# Patient Record
Sex: MALE | Race: WHITE | NOT HISPANIC OR LATINO | Employment: OTHER | URBAN - METROPOLITAN AREA
[De-identification: names, ages, dates, MRNs, and addresses within clinical notes are randomized per-mention and may not be internally consistent; named-entity substitution may affect disease eponyms.]

---

## 2022-06-14 ENCOUNTER — HOSPITAL ENCOUNTER (EMERGENCY)
Facility: HOSPITAL | Age: 87
Discharge: HOME | End: 2022-06-14
Attending: EMERGENCY MEDICINE | Admitting: EMERGENCY MEDICINE

## 2022-06-14 VITALS
TEMPERATURE: 98.4 F | WEIGHT: 152 LBS | HEIGHT: 70 IN | RESPIRATION RATE: 16 BRPM | SYSTOLIC BLOOD PRESSURE: 173 MMHG | OXYGEN SATURATION: 100 % | BODY MASS INDEX: 21.76 KG/M2 | DIASTOLIC BLOOD PRESSURE: 74 MMHG | HEART RATE: 60 BPM

## 2022-06-14 DIAGNOSIS — R53.1 GENERALIZED WEAKNESS: ICD-10-CM

## 2022-06-14 DIAGNOSIS — U07.1 COVID-19: Primary | ICD-10-CM

## 2022-06-14 LAB
ALBUMIN SERPL-MCNC: 3.8 G/DL (ref 3.5–5.2)
ALBUMIN/GLOB SERPL: 1.5 G/DL
ALP SERPL-CCNC: 111 U/L (ref 39–117)
ALT SERPL W P-5'-P-CCNC: 22 U/L (ref 1–41)
ANION GAP SERPL CALCULATED.3IONS-SCNC: 12.3 MMOL/L (ref 5–15)
ANISOCYTOSIS BLD QL: NORMAL
AST SERPL-CCNC: 27 U/L (ref 1–40)
BASOPHILS # BLD AUTO: 0.03 10*3/MM3 (ref 0–0.2)
BASOPHILS NFR BLD AUTO: 0.5 % (ref 0–1.5)
BILIRUB SERPL-MCNC: 0.3 MG/DL (ref 0–1.2)
BILIRUB UR QL STRIP: NEGATIVE
BUN SERPL-MCNC: 38 MG/DL (ref 8–23)
BUN/CREAT SERPL: 22.8 (ref 7–25)
CALCIUM SPEC-SCNC: 9.2 MG/DL (ref 8.6–10.5)
CHLORIDE SERPL-SCNC: 102 MMOL/L (ref 98–107)
CLARITY UR: CLEAR
CO2 SERPL-SCNC: 21.7 MMOL/L (ref 22–29)
COLOR UR: YELLOW
CREAT SERPL-MCNC: 1.67 MG/DL (ref 0.76–1.27)
DEPRECATED RDW RBC AUTO: 65.9 FL (ref 37–54)
EGFRCR SERPLBLD CKD-EPI 2021: 38.9 ML/MIN/1.73
EOSINOPHIL # BLD AUTO: 0.24 10*3/MM3 (ref 0–0.4)
EOSINOPHIL NFR BLD AUTO: 4.2 % (ref 0.3–6.2)
ERYTHROCYTE [DISTWIDTH] IN BLOOD BY AUTOMATED COUNT: 15.5 % (ref 12.3–15.4)
GLOBULIN UR ELPH-MCNC: 2.6 GM/DL
GLUCOSE SERPL-MCNC: 126 MG/DL (ref 65–99)
GLUCOSE UR STRIP-MCNC: NEGATIVE MG/DL
HCT VFR BLD AUTO: 26.8 % (ref 37.5–51)
HGB BLD-MCNC: 8.7 G/DL (ref 13–17.7)
HGB UR QL STRIP.AUTO: NEGATIVE
IMM GRANULOCYTES # BLD AUTO: 0.03 10*3/MM3 (ref 0–0.05)
IMM GRANULOCYTES NFR BLD AUTO: 0.5 % (ref 0–0.5)
KETONES UR QL STRIP: NEGATIVE
LEUKOCYTE ESTERASE UR QL STRIP.AUTO: NEGATIVE
LYMPHOCYTES # BLD AUTO: 2.22 10*3/MM3 (ref 0.7–3.1)
LYMPHOCYTES NFR BLD AUTO: 38.5 % (ref 19.6–45.3)
MACROCYTES BLD QL SMEAR: NORMAL
MCH RBC QN AUTO: 37.2 PG (ref 26.6–33)
MCHC RBC AUTO-ENTMCNC: 32.5 G/DL (ref 31.5–35.7)
MCV RBC AUTO: 114.5 FL (ref 79–97)
MONOCYTES # BLD AUTO: 0.63 10*3/MM3 (ref 0.1–0.9)
MONOCYTES NFR BLD AUTO: 10.9 % (ref 5–12)
NEUTROPHILS NFR BLD AUTO: 2.62 10*3/MM3 (ref 1.7–7)
NEUTROPHILS NFR BLD AUTO: 45.4 % (ref 42.7–76)
NITRITE UR QL STRIP: NEGATIVE
NRBC BLD AUTO-RTO: 0 /100 WBC (ref 0–0.2)
PH UR STRIP.AUTO: 5.5 [PH] (ref 4.5–8)
PLATELET # BLD AUTO: 299 10*3/MM3 (ref 140–450)
PMV BLD AUTO: 9.4 FL (ref 6–12)
POTASSIUM SERPL-SCNC: 4.7 MMOL/L (ref 3.5–5.2)
PROT SERPL-MCNC: 6.4 G/DL (ref 6–8.5)
PROT UR QL STRIP: NEGATIVE
RBC # BLD AUTO: 2.34 10*6/MM3 (ref 4.14–5.8)
SMALL PLATELETS BLD QL SMEAR: ADEQUATE
SODIUM SERPL-SCNC: 136 MMOL/L (ref 136–145)
SP GR UR STRIP: <=1.005 (ref 1–1.03)
UROBILINOGEN UR QL STRIP: NORMAL
WBC MORPH BLD: NORMAL
WBC NRBC COR # BLD: 5.77 10*3/MM3 (ref 3.4–10.8)

## 2022-06-14 PROCEDURE — 99283 EMERGENCY DEPT VISIT LOW MDM: CPT

## 2022-06-14 PROCEDURE — 85007 BL SMEAR W/DIFF WBC COUNT: CPT

## 2022-06-14 PROCEDURE — 81003 URINALYSIS AUTO W/O SCOPE: CPT

## 2022-06-14 PROCEDURE — 80053 COMPREHEN METABOLIC PANEL: CPT

## 2022-06-14 PROCEDURE — 99282 EMERGENCY DEPT VISIT SF MDM: CPT

## 2022-06-14 PROCEDURE — 36415 COLL VENOUS BLD VENIPUNCTURE: CPT

## 2022-06-14 PROCEDURE — 85025 COMPLETE CBC W/AUTO DIFF WBC: CPT

## 2022-06-14 RX ORDER — ATORVASTATIN CALCIUM 80 MG/1
80 TABLET, FILM COATED ORAL DAILY
COMMUNITY

## 2022-06-14 RX ORDER — CHOLECALCIFEROL (VITAMIN D3) 125 MCG
1000 CAPSULE ORAL DAILY
COMMUNITY

## 2022-06-14 RX ORDER — ASPIRIN 81 MG/1
81 TABLET ORAL DAILY
COMMUNITY

## 2022-06-14 RX ORDER — OLANZAPINE 2.5 MG/1
2.5 TABLET ORAL NIGHTLY
COMMUNITY

## 2022-06-14 RX ORDER — FOLIC ACID 1 MG/1
2 TABLET ORAL DAILY
COMMUNITY

## 2022-06-14 RX ORDER — GLIPIZIDE 10 MG/1
10 TABLET ORAL
COMMUNITY

## 2022-06-14 RX ORDER — ACETAMINOPHEN 500 MG
500 TABLET ORAL EVERY 6 HOURS PRN
COMMUNITY

## 2022-06-14 NOTE — DISCHARGE INSTRUCTIONS
You have COVID-19.  Recommend plenty of fluids and rest.  Tylenol and/or ibuprofen as needed for fever, body aches, headache.  Isolate from other family members as above.  Anyone exposed to you should quarantine per CDC recommendations.  Go to CDC.gov for current recommendations.  Recommend obtaining the Covid vaccine within 90 days of kay Covid if you are unvaccinated.  Encourage all unvaccinated friends and family to receive the Covid vaccine.  Follow-up with your PCP as above.  Return to ED for medical emergencies.

## 2022-06-14 NOTE — ED PROVIDER NOTES
EMERGENCY DEPARTMENT ENCOUNTER      Room Number: 08/08    History is provided by the patient, no translation services needed    HPI:    Chief complaint: COVID    Location: Generalized    Quality/Severity: Patient advises that he has aching in his arms and legs bilaterally.  He advises that this has gone on for several years.    Timing/Duration: 7 days    Modifying Factors: None    Associated Symptoms: None    Narrative: Pt is a 89 y.o. male who presents complaining of COVID x7 days.  Patient advises that he has been quarantining for 10 days and he wanted to leave the house today.  He states that his doctor told him to be evaluated.  When asked what the patient's complaint was he states that he has had aching in his arms and legs when he gets up in the middle of the night for several years.  He denies any other complaints.      PMD: Provider, No Known    REVIEW OF SYSTEMS  Review of Systems   Constitutional: Negative for chills and fever.   HENT: Negative for congestion and rhinorrhea.    Respiratory: Negative for cough, chest tightness and shortness of breath.    Cardiovascular: Negative for chest pain, palpitations and leg swelling.   Gastrointestinal: Negative for abdominal pain, constipation, diarrhea, nausea and vomiting.   Genitourinary: Negative for difficulty urinating and dysuria.   Musculoskeletal: Negative for back pain, gait problem and neck pain.   Skin: Negative for color change, pallor, rash and wound.   Neurological: Negative for dizziness, syncope, weakness, numbness and headaches.   Psychiatric/Behavioral: Negative for confusion and suicidal ideas. The patient is not nervous/anxious.          PAST MEDICAL HISTORY  Active Ambulatory Problems     Diagnosis Date Noted   • No Active Ambulatory Problems     Resolved Ambulatory Problems     Diagnosis Date Noted   • No Resolved Ambulatory Problems     Past Medical History:   Diagnosis Date   • Deep vein thrombosis (DVT) of left lower extremity (HCC)    •  Dementia (HCC)    • Diabetes mellitus (HCC)    • [place] (hard of hearing)    • Hyperlipidemia        PAST SURGICAL HISTORY  Past Surgical History:   Procedure Laterality Date   • PACEMAKER IMPLANTATION     • SPLENECTOMY         FAMILY HISTORY  History reviewed. No pertinent family history.    SOCIAL HISTORY  Social History     Socioeconomic History   • Marital status:    Tobacco Use   • Smoking status: Former Smoker   • Smokeless tobacco: Never Used   Substance and Sexual Activity   • Alcohol use: Never   • Drug use: Never   • Sexual activity: Defer       ALLERGIES  Sulfa antibiotics    No current facility-administered medications for this encounter.    Current Outpatient Medications:   •  acetaminophen (TYLENOL) 500 MG tablet, Take 500 mg by mouth Every 6 (Six) Hours As Needed for Mild Pain ., Disp: , Rfl:   •  apixaban (ELIQUIS) 2.5 MG tablet tablet, Take 2.5 mg by mouth 2 (Two) Times a Day., Disp: , Rfl:   •  aspirin 81 MG EC tablet, Take 81 mg by mouth Daily., Disp: , Rfl:   •  atorvastatin (LIPITOR) 80 MG tablet, Take 80 mg by mouth Daily., Disp: , Rfl:   •  folic acid (FOLVITE) 1 MG tablet, Take 2 mg by mouth Daily., Disp: , Rfl:   •  glipizide (GLUCOTROL) 10 MG tablet, Take 10 mg by mouth 2 (Two) Times a Day Before Meals., Disp: , Rfl:   •  Molnupiravir (LAGEVRIO) 200 MG capsule, Take 800 mg by mouth Every 12 (Twelve) Hours., Disp: , Rfl:   •  OLANZapine (zyPREXA) 2.5 MG tablet, Take 2.5 mg by mouth Every Night., Disp: , Rfl:   •  vitamin B-12 (CYANOCOBALAMIN) 500 MCG tablet, Take 1,000 mcg by mouth Daily., Disp: , Rfl:     PHYSICAL EXAM  ED Triage Vitals [06/14/22 1708]   Temp Heart Rate Resp BP SpO2   98.4 °F (36.9 °C) 60 18 168/82 98 %      Temp src Heart Rate Source Patient Position BP Location FiO2 (%)   Oral Monitor Lying Right arm --       Physical Exam  Vitals and nursing note reviewed. Exam conducted with a chaperone present.   Constitutional:       General: He is not in acute distress.      Appearance: Normal appearance. He is not ill-appearing, toxic-appearing or diaphoretic.   HENT:      Head: Normocephalic and atraumatic.   Eyes:      Extraocular Movements: Extraocular movements intact.      Conjunctiva/sclera: Conjunctivae normal.      Pupils: Pupils are equal, round, and reactive to light.   Cardiovascular:      Rate and Rhythm: Normal rate and regular rhythm.      Heart sounds: Normal heart sounds.   Pulmonary:      Effort: Pulmonary effort is normal. No respiratory distress.      Breath sounds: Normal breath sounds. No wheezing, rhonchi or rales.   Abdominal:      General: Bowel sounds are normal. There is no distension.      Palpations: Abdomen is soft.      Tenderness: There is no abdominal tenderness. There is no guarding or rebound.   Genitourinary:     Rectum: Normal. Guaiac result negative.   Musculoskeletal:         General: No swelling, tenderness, deformity or signs of injury. Normal range of motion.      Cervical back: Normal range of motion and neck supple.      Right lower leg: No edema.      Left lower leg: No edema.   Skin:     General: Skin is warm and dry.      Coloration: Skin is not jaundiced or pale.      Findings: No bruising, erythema, lesion or rash.   Neurological:      Mental Status: He is alert and oriented to person, place, and time.   Psychiatric:         Mood and Affect: Mood and affect normal.           LAB RESULTS  Lab Results (last 24 hours)     Procedure Component Value Units Date/Time    CBC & Differential [[id number]]  (Abnormal) Collected: 06/14/22 1809    Specimen: Blood Updated: 06/14/22 1837    Narrative:      The following orders were created for panel order CBC & Differential.  Procedure                               Abnormality         Status                     ---------                               -----------         ------                     CBC Auto Differential[[id number]]        Abnormal            Final result               Scan Slide[[id number]]                                        Final result                 Please view results for these tests on the individual orders.    Comprehensive Metabolic Panel [[id number]]  (Abnormal) Collected: 06/14/22 1809    Specimen: Blood Updated: 06/14/22 1829     Glucose 126 mg/dL      BUN 38 mg/dL      Creatinine 1.67 mg/dL      Sodium 136 mmol/L      Potassium 4.7 mmol/L      Chloride 102 mmol/L      CO2 21.7 mmol/L      Calcium 9.2 mg/dL      Total Protein 6.4 g/dL      Albumin 3.80 g/dL      ALT (SGPT) 22 U/L      AST (SGOT) 27 U/L      Alkaline Phosphatase 111 U/L      Total Bilirubin 0.3 mg/dL      Globulin 2.6 gm/dL      A/G Ratio 1.5 g/dL      BUN/Creatinine Ratio 22.8     Anion Gap 12.3 mmol/L      eGFR 38.9 mL/min/1.73      Comment: National Kidney Foundation and American Society of Nephrology (ASN) Task Force recommended calculation based on the Chronic Kidney Disease Epidemiology Collaboration (CKD-EPI) equation refit without adjustment for race.       Narrative:      GFR Normal >60  Chronic Kidney Disease <60  Kidney Failure <15      Urinalysis With Microscopic If Indicated (No Culture) - Urine, Clean Catch [[id number]]  (Normal) Collected: 06/14/22 1809    Specimen: Urine, Clean Catch Updated: 06/14/22 1817     Color, UA Yellow     Appearance, UA Clear     pH, UA 5.5     Specific Gravity, UA <=1.005     Glucose, UA Negative     Ketones, UA Negative     Bilirubin, UA Negative     Blood, UA Negative     Protein, UA Negative     Leuk Esterase, UA Negative     Nitrite, UA Negative     Urobilinogen, UA 0.2 E.U./dL    Narrative:      Urine microscopic not indicated.    CBC Auto Differential [[id number]]  (Abnormal) Collected: 06/14/22 1809    Specimen: Blood Updated: 06/14/22 1817     WBC 5.77 10*3/mm3      RBC 2.34 10*6/mm3      Hemoglobin 8.7 g/dL      Hematocrit 26.8 %      .5 fL      MCH 37.2 pg      MCHC 32.5 g/dL      RDW 15.5 %      RDW-SD 65.9 fl      MPV 9.4 fL      Platelets 299 10*3/mm3       Neutrophil % 45.4 %      Lymphocyte % 38.5 %      Monocyte % 10.9 %      Eosinophil % 4.2 %      Basophil % 0.5 %      Immature Grans % 0.5 %      Neutrophils, Absolute 2.62 10*3/mm3      Lymphocytes, Absolute 2.22 10*3/mm3      Monocytes, Absolute 0.63 10*3/mm3      Eosinophils, Absolute 0.24 10*3/mm3      Basophils, Absolute 0.03 10*3/mm3      Immature Grans, Absolute 0.03 10*3/mm3      nRBC 0.0 /100 WBC     Scan Slide [[id number]] Collected: 06/14/22 1809    Specimen: Blood Updated: 06/14/22 1837     Anisocytosis Slight/1+     Macrocytes Slight/1+     WBC Morphology Normal     Platelet Estimate Adequate        I ordered the above labs and reviewed the results    RADIOLOGY  No Radiology Exams Resulted Within Past 24 Hours        PROCEDURES  Procedures      PROGRESS AND CONSULTS  ED Course as of 06/14/22 1937 Tue Jun 14, 2022 1752 Patient is a very poor historian.  Jose Alfredo Grove RN, spoke with the patient's daughter who advises [facility] basic labs and a urinalysis performed to make sure that he has not worsened from his COVID diagnosis.  She advises that the patient is on day 7 of COVID-19. []   1832 Spoke with the pt's daughter. She advised that [facility]. She has no documents of recent lab work for him. She does advise that he is anemic and has kidney problems. Will attempt to get information from the [facility] for comparison to today's labs.  [AH]   1839 Attempting to get medical records from the [facility] for comparison. [AH]   1917 CBC [facility] by [facility] indicate the following results from 03/16/2022:     RBC: 2.4  HGB: 8.9  HCT: 26.9  MCV: 111.2  MCH: 36.8  RDW: 15.5 [AH]   1922 CMP lab results fax[facility] by the [facility] indicate the following results from 03/16/2022:    Glucose: 130  BUN: 47  Creatinine: 1.75  CO2: 24  eGFR: 37 [AH]   1924 Based on labs from the [facility], pt is within his normal limits. Will perform a MIKKI and Hemoccult exam to rule out GI bleed. [AH]    1935 Hemoccult negative.  Hemorrhoids noted upon exam.  Chaperoned by an ER tech. [AH]      ED Course User Index  [AH] Joyce Mooney PA-C           MEDICAL DECISION MAKING    MDM     My differential diagnosis includes but is not limited to generalized weakness, electrolyte abnormality, CVA, TIA, Bell's palsy, acute MI, GI bleed, urinary tract infection, systemic infections including sepsis, alcohol abuse, drug abuse including prescription and street drug.  DIAGNOSIS  Final diagnoses:   COVID-19   Generalized weakness       Latest Documented Vital Signs:  As of 19:37 EDT  BP- 177/76 HR- 60 Temp- 98.4 °F (36.9 °C) (Oral) O2 sat- 97%    DISPOSITION  Pt discharged    Discussed pertinent findings with the patient/family.  Patient/Family voiced understanding of need to follow-up for recheck and further testing as needed.  Return to the Emergency Department warnings were given.         Medication List      No changes were made to your prescriptions during this visit.              Follow-up Information     Provider, No Known. Call today.    Why: to schedule follow up  Contact information:  [facility] [address] [phone]             Go to  [facility] Emergency Department.    Specialty: Emergency Medicine  Why: If symptoms worsen  Contact information:  [address] [address] [phone]                         Dictated utilizing Dragon dictation     Joyce Mooney PA-C  06/14/22 1937